# Patient Record
Sex: FEMALE | Race: WHITE | NOT HISPANIC OR LATINO | URBAN - METROPOLITAN AREA
[De-identification: names, ages, dates, MRNs, and addresses within clinical notes are randomized per-mention and may not be internally consistent; named-entity substitution may affect disease eponyms.]

---

## 2017-09-06 ENCOUNTER — INPATIENT (INPATIENT)
Facility: HOSPITAL | Age: 27
LOS: 1 days | Discharge: HOME | End: 2017-09-08
Attending: OBSTETRICS & GYNECOLOGY | Admitting: OBSTETRICS & GYNECOLOGY
Payer: COMMERCIAL

## 2017-09-06 VITALS — HEIGHT: 63 IN | WEIGHT: 158.73 LBS

## 2017-09-06 DIAGNOSIS — Z33.1 PREGNANT STATE, INCIDENTAL: ICD-10-CM

## 2017-09-06 LAB
BASOPHILS # BLD AUTO: 0 K/UL — SIGNIFICANT CHANGE UP (ref 0–0.2)
BASOPHILS NFR BLD AUTO: 0.2 % — SIGNIFICANT CHANGE UP (ref 0–2)
BLD GP AB SCN SERPL QL: NEGATIVE — SIGNIFICANT CHANGE UP
EOSINOPHIL # BLD AUTO: 0 K/UL — SIGNIFICANT CHANGE UP (ref 0–0.5)
EOSINOPHIL NFR BLD AUTO: 0.3 % — SIGNIFICANT CHANGE UP (ref 0–6)
HCT VFR BLD CALC: 35.3 % — SIGNIFICANT CHANGE UP (ref 34.5–45)
HGB BLD-MCNC: 12.4 G/DL — SIGNIFICANT CHANGE UP (ref 11.5–15.5)
LYMPHOCYTES # BLD AUTO: 19.1 % — SIGNIFICANT CHANGE UP (ref 13–44)
LYMPHOCYTES # BLD AUTO: 2.4 K/UL — SIGNIFICANT CHANGE UP (ref 1–3.3)
MCHC RBC-ENTMCNC: 30.2 PG — SIGNIFICANT CHANGE UP (ref 27–34)
MCHC RBC-ENTMCNC: 35.3 GM/DL — SIGNIFICANT CHANGE UP (ref 32–36)
MCV RBC AUTO: 85.5 FL — SIGNIFICANT CHANGE UP (ref 80–100)
MONOCYTES # BLD AUTO: 0.9 K/UL — SIGNIFICANT CHANGE UP (ref 0–0.9)
MONOCYTES NFR BLD AUTO: 6.9 % — SIGNIFICANT CHANGE UP (ref 2–14)
NEUTROPHILS # BLD AUTO: 9.2 K/UL — HIGH (ref 1.8–7.4)
NEUTROPHILS NFR BLD AUTO: 73.5 % — SIGNIFICANT CHANGE UP (ref 43–77)
PLATELET # BLD AUTO: 163 K/UL — SIGNIFICANT CHANGE UP (ref 150–400)
RBC # BLD: 4.12 M/UL — SIGNIFICANT CHANGE UP (ref 3.8–5.2)
RBC # FLD: 12.7 % — SIGNIFICANT CHANGE UP (ref 10.3–14.5)
RH IG SCN BLD-IMP: POSITIVE — SIGNIFICANT CHANGE UP
T PALLIDUM AB TITR SER: NEGATIVE — SIGNIFICANT CHANGE UP
WBC # BLD: 12.5 K/UL — HIGH (ref 3.8–10.5)
WBC # FLD AUTO: 12.5 K/UL — HIGH (ref 3.8–10.5)

## 2017-09-06 RX ORDER — SODIUM CHLORIDE 9 MG/ML
1000 INJECTION, SOLUTION INTRAVENOUS
Qty: 0 | Refills: 0 | Status: DISCONTINUED | OUTPATIENT
Start: 2017-09-06 | End: 2017-09-06

## 2017-09-06 RX ORDER — SODIUM CHLORIDE 9 MG/ML
3 INJECTION INTRAMUSCULAR; INTRAVENOUS; SUBCUTANEOUS EVERY 8 HOURS
Qty: 0 | Refills: 0 | Status: DISCONTINUED | OUTPATIENT
Start: 2017-09-06 | End: 2017-09-08

## 2017-09-06 RX ORDER — DIPHENHYDRAMINE HCL 50 MG
25 CAPSULE ORAL EVERY 6 HOURS
Qty: 0 | Refills: 0 | Status: DISCONTINUED | OUTPATIENT
Start: 2017-09-06 | End: 2017-09-08

## 2017-09-06 RX ORDER — MAGNESIUM HYDROXIDE 400 MG/1
30 TABLET, CHEWABLE ORAL
Qty: 0 | Refills: 0 | Status: DISCONTINUED | OUTPATIENT
Start: 2017-09-06 | End: 2017-09-08

## 2017-09-06 RX ORDER — HYDROCORTISONE 1 %
1 OINTMENT (GRAM) TOPICAL EVERY 4 HOURS
Qty: 0 | Refills: 0 | Status: DISCONTINUED | OUTPATIENT
Start: 2017-09-06 | End: 2017-09-06

## 2017-09-06 RX ORDER — CITRIC ACID/SODIUM CITRATE 300-500 MG
15 SOLUTION, ORAL ORAL EVERY 4 HOURS
Qty: 0 | Refills: 0 | Status: DISCONTINUED | OUTPATIENT
Start: 2017-09-06 | End: 2017-09-06

## 2017-09-06 RX ORDER — KETOROLAC TROMETHAMINE 30 MG/ML
30 SYRINGE (ML) INJECTION ONCE
Qty: 0 | Refills: 0 | Status: DISCONTINUED | OUTPATIENT
Start: 2017-09-06 | End: 2017-09-06

## 2017-09-06 RX ORDER — OXYTOCIN 10 UNIT/ML
41.67 VIAL (ML) INJECTION
Qty: 20 | Refills: 0 | Status: DISCONTINUED | OUTPATIENT
Start: 2017-09-06 | End: 2017-09-08

## 2017-09-06 RX ORDER — ACETAMINOPHEN 500 MG
975 TABLET ORAL EVERY 6 HOURS
Qty: 0 | Refills: 0 | Status: DISCONTINUED | OUTPATIENT
Start: 2017-09-06 | End: 2017-09-08

## 2017-09-06 RX ORDER — HYDROCORTISONE 1 %
1 OINTMENT (GRAM) TOPICAL EVERY 4 HOURS
Qty: 0 | Refills: 0 | Status: DISCONTINUED | OUTPATIENT
Start: 2017-09-06 | End: 2017-09-08

## 2017-09-06 RX ORDER — SODIUM CHLORIDE 9 MG/ML
500 INJECTION, SOLUTION INTRAVENOUS ONCE
Qty: 0 | Refills: 0 | Status: DISCONTINUED | OUTPATIENT
Start: 2017-09-06 | End: 2017-09-06

## 2017-09-06 RX ORDER — LANOLIN
1 OINTMENT (GRAM) TOPICAL EVERY 6 HOURS
Qty: 0 | Refills: 0 | Status: DISCONTINUED | OUTPATIENT
Start: 2017-09-06 | End: 2017-09-08

## 2017-09-06 RX ORDER — ACETAMINOPHEN 500 MG
975 TABLET ORAL EVERY 6 HOURS
Qty: 0 | Refills: 0 | Status: COMPLETED | OUTPATIENT
Start: 2017-09-06 | End: 2018-08-05

## 2017-09-06 RX ORDER — SODIUM CHLORIDE 9 MG/ML
3 INJECTION INTRAMUSCULAR; INTRAVENOUS; SUBCUTANEOUS EVERY 8 HOURS
Qty: 0 | Refills: 0 | Status: DISCONTINUED | OUTPATIENT
Start: 2017-09-06 | End: 2017-09-06

## 2017-09-06 RX ORDER — AER TRAVELER 0.5 G/1
1 SOLUTION RECTAL; TOPICAL EVERY 4 HOURS
Qty: 0 | Refills: 0 | Status: DISCONTINUED | OUTPATIENT
Start: 2017-09-06 | End: 2017-09-06

## 2017-09-06 RX ORDER — OXYTOCIN 10 UNIT/ML
4 VIAL (ML) INJECTION
Qty: 30 | Refills: 0 | Status: DISCONTINUED | OUTPATIENT
Start: 2017-09-06 | End: 2017-09-06

## 2017-09-06 RX ORDER — DIBUCAINE 1 %
1 OINTMENT (GRAM) RECTAL EVERY 4 HOURS
Qty: 0 | Refills: 0 | Status: DISCONTINUED | OUTPATIENT
Start: 2017-09-06 | End: 2017-09-06

## 2017-09-06 RX ORDER — DIBUCAINE 1 %
1 OINTMENT (GRAM) RECTAL EVERY 4 HOURS
Qty: 0 | Refills: 0 | Status: DISCONTINUED | OUTPATIENT
Start: 2017-09-06 | End: 2017-09-08

## 2017-09-06 RX ORDER — SIMETHICONE 80 MG/1
80 TABLET, CHEWABLE ORAL EVERY 6 HOURS
Qty: 0 | Refills: 0 | Status: DISCONTINUED | OUTPATIENT
Start: 2017-09-06 | End: 2017-09-08

## 2017-09-06 RX ORDER — AER TRAVELER 0.5 G/1
1 SOLUTION RECTAL; TOPICAL EVERY 4 HOURS
Qty: 0 | Refills: 0 | Status: DISCONTINUED | OUTPATIENT
Start: 2017-09-06 | End: 2017-09-08

## 2017-09-06 RX ORDER — TETANUS TOXOID, REDUCED DIPHTHERIA TOXOID AND ACELLULAR PERTUSSIS VACCINE, ADSORBED 5; 2.5; 8; 8; 2.5 [IU]/.5ML; [IU]/.5ML; UG/.5ML; UG/.5ML; UG/.5ML
0.5 SUSPENSION INTRAMUSCULAR ONCE
Qty: 0 | Refills: 0 | Status: DISCONTINUED | OUTPATIENT
Start: 2017-09-06 | End: 2017-09-08

## 2017-09-06 RX ORDER — OXYTOCIN 10 UNIT/ML
333.33 VIAL (ML) INJECTION
Qty: 20 | Refills: 0 | Status: DISCONTINUED | OUTPATIENT
Start: 2017-09-06 | End: 2017-09-06

## 2017-09-06 RX ORDER — DOCUSATE SODIUM 100 MG
100 CAPSULE ORAL
Qty: 0 | Refills: 0 | Status: DISCONTINUED | OUTPATIENT
Start: 2017-09-06 | End: 2017-09-08

## 2017-09-06 RX ORDER — INFLUENZA VIRUS VACCINE 15; 15; 15; 15 UG/.5ML; UG/.5ML; UG/.5ML; UG/.5ML
0.5 SUSPENSION INTRAMUSCULAR ONCE
Qty: 0 | Refills: 0 | Status: COMPLETED | OUTPATIENT
Start: 2017-09-06 | End: 2017-09-08

## 2017-09-06 RX ORDER — IBUPROFEN 200 MG
600 TABLET ORAL EVERY 6 HOURS
Qty: 0 | Refills: 0 | Status: COMPLETED | OUTPATIENT
Start: 2017-09-06 | End: 2018-08-05

## 2017-09-06 RX ORDER — GLYCERIN ADULT
1 SUPPOSITORY, RECTAL RECTAL AT BEDTIME
Qty: 0 | Refills: 0 | Status: DISCONTINUED | OUTPATIENT
Start: 2017-09-06 | End: 2017-09-08

## 2017-09-06 RX ORDER — OXYCODONE HYDROCHLORIDE 5 MG/1
5 TABLET ORAL
Qty: 0 | Refills: 0 | Status: DISCONTINUED | OUTPATIENT
Start: 2017-09-06 | End: 2017-09-08

## 2017-09-06 RX ORDER — PRAMOXINE HYDROCHLORIDE 150 MG/15G
1 AEROSOL, FOAM RECTAL EVERY 4 HOURS
Qty: 0 | Refills: 0 | Status: DISCONTINUED | OUTPATIENT
Start: 2017-09-06 | End: 2017-09-06

## 2017-09-06 RX ORDER — PRAMOXINE HYDROCHLORIDE 150 MG/15G
1 AEROSOL, FOAM RECTAL EVERY 4 HOURS
Qty: 0 | Refills: 0 | Status: DISCONTINUED | OUTPATIENT
Start: 2017-09-06 | End: 2017-09-08

## 2017-09-06 RX ORDER — OXYTOCIN 10 UNIT/ML
41.67 VIAL (ML) INJECTION
Qty: 20 | Refills: 0 | Status: DISCONTINUED | OUTPATIENT
Start: 2017-09-06 | End: 2017-09-06

## 2017-09-06 RX ORDER — OXYCODONE HYDROCHLORIDE 5 MG/1
5 TABLET ORAL EVERY 4 HOURS
Qty: 0 | Refills: 0 | Status: DISCONTINUED | OUTPATIENT
Start: 2017-09-06 | End: 2017-09-08

## 2017-09-06 RX ORDER — IBUPROFEN 200 MG
600 TABLET ORAL EVERY 6 HOURS
Qty: 0 | Refills: 0 | Status: DISCONTINUED | OUTPATIENT
Start: 2017-09-06 | End: 2017-09-08

## 2017-09-06 RX ADMIN — Medication 975 MILLIGRAM(S): at 17:25

## 2017-09-06 RX ADMIN — Medication 30 MILLIGRAM(S): at 13:18

## 2017-09-06 RX ADMIN — OXYCODONE HYDROCHLORIDE 5 MILLIGRAM(S): 5 TABLET ORAL at 22:43

## 2017-09-06 RX ADMIN — SODIUM CHLORIDE 125 MILLILITER(S): 9 INJECTION, SOLUTION INTRAVENOUS at 05:46

## 2017-09-06 RX ADMIN — Medication 30 MILLIGRAM(S): at 15:00

## 2017-09-06 RX ADMIN — Medication 4 MILLIUNIT(S)/MIN: at 05:46

## 2017-09-06 RX ADMIN — Medication 600 MILLIGRAM(S): at 19:04

## 2017-09-06 RX ADMIN — Medication 600 MILLIGRAM(S): at 19:40

## 2017-09-06 RX ADMIN — Medication 975 MILLIGRAM(S): at 18:01

## 2017-09-06 RX ADMIN — Medication 975 MILLIGRAM(S): at 23:17

## 2017-09-06 RX ADMIN — OXYCODONE HYDROCHLORIDE 5 MILLIGRAM(S): 5 TABLET ORAL at 22:00

## 2017-09-07 LAB
HCT VFR BLD CALC: 31.2 % — LOW (ref 34.5–45)
HGB BLD-MCNC: 10.1 G/DL — LOW (ref 11.5–15.5)

## 2017-09-07 RX ADMIN — Medication 975 MILLIGRAM(S): at 13:10

## 2017-09-07 RX ADMIN — Medication 1 TABLET(S): at 12:39

## 2017-09-07 RX ADMIN — Medication 600 MILLIGRAM(S): at 09:20

## 2017-09-07 RX ADMIN — Medication 975 MILLIGRAM(S): at 19:50

## 2017-09-07 RX ADMIN — OXYCODONE HYDROCHLORIDE 5 MILLIGRAM(S): 5 TABLET ORAL at 04:45

## 2017-09-07 RX ADMIN — Medication 600 MILLIGRAM(S): at 23:05

## 2017-09-07 RX ADMIN — Medication 600 MILLIGRAM(S): at 08:47

## 2017-09-07 RX ADMIN — Medication 975 MILLIGRAM(S): at 06:20

## 2017-09-07 RX ADMIN — OXYCODONE HYDROCHLORIDE 5 MILLIGRAM(S): 5 TABLET ORAL at 01:00

## 2017-09-07 RX ADMIN — OXYCODONE HYDROCHLORIDE 5 MILLIGRAM(S): 5 TABLET ORAL at 09:20

## 2017-09-07 RX ADMIN — Medication 600 MILLIGRAM(S): at 17:00

## 2017-09-07 RX ADMIN — Medication 975 MILLIGRAM(S): at 12:40

## 2017-09-07 RX ADMIN — OXYCODONE HYDROCHLORIDE 5 MILLIGRAM(S): 5 TABLET ORAL at 03:57

## 2017-09-07 RX ADMIN — OXYCODONE HYDROCHLORIDE 5 MILLIGRAM(S): 5 TABLET ORAL at 08:47

## 2017-09-07 RX ADMIN — Medication 600 MILLIGRAM(S): at 01:45

## 2017-09-07 RX ADMIN — Medication 600 MILLIGRAM(S): at 22:34

## 2017-09-07 RX ADMIN — Medication 975 MILLIGRAM(S): at 20:20

## 2017-09-07 RX ADMIN — Medication 600 MILLIGRAM(S): at 01:00

## 2017-09-07 RX ADMIN — Medication 100 MILLIGRAM(S): at 12:39

## 2017-09-07 RX ADMIN — Medication 600 MILLIGRAM(S): at 16:31

## 2017-09-07 RX ADMIN — OXYCODONE HYDROCHLORIDE 5 MILLIGRAM(S): 5 TABLET ORAL at 01:45

## 2017-09-07 RX ADMIN — Medication 975 MILLIGRAM(S): at 00:00

## 2017-09-07 NOTE — PROGRESS NOTE ADULT - PROBLEM SELECTOR PLAN 1
- Pain well controlled, continue current pain regimen  - Increase ambulation, SCDs when not ambulating  - Continue regular diet    Alcira Celaya D.O. PGY1

## 2017-09-07 NOTE — PROGRESS NOTE ADULT - SUBJECTIVE AND OBJECTIVE BOX
OB Progress Note:  PPD#1    S: 28yo PPD#1 s/p . Patient feels well. Pain is well controlled. She is tolerating a regular diet and passing flatus. She is voiding spontaneously, and ambulating without difficulty. Denies CP/SOB. Denies lightheadedness/dizziness. Denies N/V.    O:  Vitals:  Vital Signs Last 24 Hrs  T(C): 36.7 (07 Sep 2017 05:26), Max: 36.8 (06 Sep 2017 17:00)  T(F): 98.1 (07 Sep 2017 05:26), Max: 98.2 (06 Sep 2017 17:00)  HR: 70 (07 Sep 2017 05:) (64 - 83)  BP: 97/61 (07 Sep 2017 05:26) (93/61 - 127/65)  BP(mean): --  RR: 18 (07 Sep 2017 05:26) (18 - 20)  SpO2: 97% (07 Sep 2017 05:) (97% - 99%)    MEDICATIONS  (STANDING):  oxyCODONE    IR 5 milliGRAM(s) Oral every 3 hours  sodium chloride 0.9% lock flush 3 milliLiter(s) IV Push every 8 hours  diphtheria/tetanus/pertussis (acellular) Vaccine (ADAcel) 0.5 milliLiter(s) IntraMuscular once  oxytocin Infusion 41.667 milliUNIT(s)/Min (125 mL/Hr) IV Continuous <Continuous>  prenatal multivitamin 1 Tablet(s) Oral daily  influenza   Vaccine 0.5 milliLiter(s) IntraMuscular once  acetaminophen   Tablet. 975 milliGRAM(s) Oral every 6 hours  ibuprofen  Tablet 600 milliGRAM(s) Oral every 6 hours      Labs:  Blood type: A Positive  Rubella IgG: RPR: Negative                          12.4   12.5<H> >-----------< 163    (  @ 05:41 )             35.3      Physical Exam:  General: NAD  Abdomen: soft, non-tender, non-distended, fundus firm  Vaginal: Lochia wnl  Extremities: NTBL

## 2017-09-08 ENCOUNTER — TRANSCRIPTION ENCOUNTER (OUTPATIENT)
Age: 27
End: 2017-09-08

## 2017-09-08 VITALS
HEART RATE: 74 BPM | RESPIRATION RATE: 18 BRPM | TEMPERATURE: 98 F | DIASTOLIC BLOOD PRESSURE: 66 MMHG | SYSTOLIC BLOOD PRESSURE: 103 MMHG

## 2017-09-08 PROCEDURE — 86901 BLOOD TYPING SEROLOGIC RH(D): CPT

## 2017-09-08 PROCEDURE — 86850 RBC ANTIBODY SCREEN: CPT

## 2017-09-08 PROCEDURE — 59025 FETAL NON-STRESS TEST: CPT

## 2017-09-08 PROCEDURE — 85027 COMPLETE CBC AUTOMATED: CPT

## 2017-09-08 PROCEDURE — 59050 FETAL MONITOR W/REPORT: CPT

## 2017-09-08 PROCEDURE — 86900 BLOOD TYPING SEROLOGIC ABO: CPT

## 2017-09-08 PROCEDURE — 85018 HEMOGLOBIN: CPT

## 2017-09-08 PROCEDURE — 86780 TREPONEMA PALLIDUM: CPT

## 2017-09-08 PROCEDURE — 90686 IIV4 VACC NO PRSV 0.5 ML IM: CPT

## 2017-09-08 RX ORDER — DOCUSATE SODIUM 100 MG
1 CAPSULE ORAL
Qty: 0 | Refills: 0 | DISCHARGE
Start: 2017-09-08

## 2017-09-08 RX ORDER — ACETAMINOPHEN 500 MG
3 TABLET ORAL
Qty: 0 | Refills: 0 | DISCHARGE
Start: 2017-09-08

## 2017-09-08 RX ADMIN — Medication 600 MILLIGRAM(S): at 05:52

## 2017-09-08 RX ADMIN — Medication 1 TABLET(S): at 11:57

## 2017-09-08 RX ADMIN — Medication 600 MILLIGRAM(S): at 11:57

## 2017-09-08 RX ADMIN — Medication 975 MILLIGRAM(S): at 09:58

## 2017-09-08 RX ADMIN — Medication 975 MILLIGRAM(S): at 01:54

## 2017-09-08 RX ADMIN — Medication 600 MILLIGRAM(S): at 06:25

## 2017-09-08 RX ADMIN — INFLUENZA VIRUS VACCINE 0.5 MILLILITER(S): 15; 15; 15; 15 SUSPENSION INTRAMUSCULAR at 05:53

## 2017-09-08 RX ADMIN — Medication 975 MILLIGRAM(S): at 02:30

## 2017-09-08 NOTE — DISCHARGE NOTE OB - CARE PLAN
Principal Discharge DX:	Spontaneous vaginal delivery  Goal:	recovery  Instructions for follow-up, activity and diet:	follow up in office in 6 weeks for postpartum visit

## 2017-09-08 NOTE — DISCHARGE NOTE OB - CARE PROVIDER_API CALL
Sanya Ching), Obstetrics and Gynecology  [address] [place] [id number]  Phone: [phone]  Fax: [phone]

## 2017-09-08 NOTE — PROGRESS NOTE ADULT - SUBJECTIVE AND OBJECTIVE BOX
PPD#2- ATTENDING NOTE    S: Patient doing well. Minimal lochia. Pain controlled.    O: Vital Signs Last 24 Hrs  T(C): 36.8 (08 Sep 2017 03:25), Max: 36.8 (08 Sep 2017 03:25)  T(F): 98.2 (08 Sep 2017 03:25), Max: 98.2 (08 Sep 2017 03:25)  HR: 74 (08 Sep 2017 03:25) (74 - 80)  BP: 103/66 (08 Sep 2017 03:25) (99/69 - 103/66)  BP(mean): --  RR: 18 (08 Sep 2017 03:25) (18 - 18)  SpO2: --    Gen: NAD  Abd: soft, NT, ND, fundus firm below umbilicus  Lochia: moderate  Ext: no tenderness, no hyper reflexia    Labs:                        10.1   x     )-----------( x        ( 07 Sep 2017 09:03 )             31.2

## 2017-09-08 NOTE — DISCHARGE NOTE OB - MATERIALS PROVIDED
Bottle Feeding Log/Shaken Baby Prevention Handout/Breastfeeding Mother’s Support Group Information/Breastfeeding Guide and Packet/Birth Certificate Instructions/Guide to Postpartum Care/Back To Sleep Handout/[facility] Hearing Screen Program/[facility]  Screening Program/Breastfeeding Log

## 2017-09-08 NOTE — DISCHARGE NOTE OB - MEDICATION SUMMARY - MEDICATIONS TO TAKE
I will START or STAY ON the medications listed below when I get home from the hospital:    ibuprofen 600 mg oral tablet  -- 1 tab(s) by mouth every 6 hours  -- Indication: For Pain    acetaminophen 325 mg oral tablet  -- 3 tab(s) by mouth every 6 hours  -- Indication: For Pain    docusate sodium 100 mg oral capsule  -- 1 cap(s) by mouth 2 times a day, As needed, Stool Softening  -- Indication: For constipation

## 2017-09-08 NOTE — DISCHARGE NOTE OB - PATIENT PORTAL LINK FT
“You can access the FollowHealth Patient Portal, offered by [facility], by registering with the following website: [email or web address]”

## 2019-07-22 ENCOUNTER — APPOINTMENT (OUTPATIENT)
Dept: ANTEPARTUM | Facility: CLINIC | Age: 29
End: 2019-07-22
Payer: COMMERCIAL

## 2019-07-22 ENCOUNTER — ASOB RESULT (OUTPATIENT)
Age: 29
End: 2019-07-22

## 2019-07-22 PROCEDURE — 76805 OB US >/= 14 WKS SNGL FETUS: CPT

## 2019-08-13 ENCOUNTER — EMERGENCY (EMERGENCY)
Facility: HOSPITAL | Age: 29
LOS: 1 days | Discharge: HOME | End: 2019-08-13
Attending: EMERGENCY MEDICINE
Payer: COMMERCIAL

## 2019-08-13 VITALS
DIASTOLIC BLOOD PRESSURE: 64 MMHG | OXYGEN SATURATION: 100 % | SYSTOLIC BLOOD PRESSURE: 105 MMHG | HEART RATE: 84 BPM | RESPIRATION RATE: 18 BRPM

## 2019-08-13 VITALS
WEIGHT: 134.92 LBS | DIASTOLIC BLOOD PRESSURE: 75 MMHG | SYSTOLIC BLOOD PRESSURE: 156 MMHG | HEART RATE: 93 BPM | TEMPERATURE: 98 F | RESPIRATION RATE: 22 BRPM | OXYGEN SATURATION: 97 %

## 2019-08-13 PROCEDURE — 76815 OB US LIMITED FETUS(S): CPT | Mod: 26

## 2019-08-13 PROCEDURE — 99284 EMERGENCY DEPT VISIT MOD MDM: CPT

## 2019-08-13 PROCEDURE — 76815 OB US LIMITED FETUS(S): CPT

## 2019-08-13 RX ORDER — AZITHROMYCIN 500 MG/1
500 TABLET, FILM COATED ORAL ONCE
Refills: 0 | Status: COMPLETED | OUTPATIENT
Start: 2019-08-13 | End: 2019-08-13

## 2019-08-13 RX ORDER — ACETAMINOPHEN 500 MG
650 TABLET ORAL ONCE
Refills: 0 | Status: COMPLETED | OUTPATIENT
Start: 2019-08-13 | End: 2019-08-13

## 2019-08-13 RX ORDER — LIDOCAINE 4 G/100G
1 CREAM TOPICAL ONCE
Refills: 0 | Status: DISCONTINUED | OUTPATIENT
Start: 2019-08-13 | End: 2019-08-30

## 2019-08-13 RX ORDER — AZITHROMYCIN 500 MG/1
1 TABLET, FILM COATED ORAL
Qty: 4 | Refills: 0
Start: 2019-08-13 | End: 2019-08-16

## 2019-08-13 RX ADMIN — Medication 650 MILLIGRAM(S): at 07:35

## 2019-08-13 RX ADMIN — AZITHROMYCIN 500 MILLIGRAM(S): 500 TABLET, FILM COATED ORAL at 07:41

## 2019-08-13 NOTE — ED PROVIDER NOTE - PROGRESS NOTE DETAILS
Attending note (Jean-Pierre): given allergy profile and pregnancy, will prescribe azithromycin (500mg x1 followed by 250mg qd x 4d); trial topical lidocaine in ED with improvement (and s/p tylenol).  stable for dc.

## 2019-08-13 NOTE — ED PROVIDER NOTE - NS ED ROS FT
General: no fevers or chills  Head: no lightheadedness  Eyes: no red eye or discharge  ENT: +sore throat, +left ear pain, no nasal discharge/congestion  CV: no chest pain  Resp: no SOB, no cough  GI: +N/V, no abdominal pain  : no dysuria, no vaginal discharge  MSK: no joint pain, no muscle aches, no neck pain or back pain  Skin: no new rash  Neuro: no focal weakness, no change in sensation

## 2019-08-13 NOTE — ED PROVIDER NOTE - OBJECTIVE STATEMENT
30yo pregnant woman due date 12/8/19 presents with left ear pain x 1 day with associated sore throat for the past few days with no fever and 1 episode of nonbloody emesis this morning. She denies cough, runny nose, eye redness or discharge. No chest pain or SOB. No abdominal pain. No dysuria. Pt took 1 extra strength tylenol at 3am this morning with no relief. 28yo pregnant woman due date 19 presents with left ear pain x 1 day with associated sore throat for the past few days with no fever and 1 episode of nonbloody emesis this morning. She denies cough, runny nose, eye redness or discharge. No chest pain or SOB. No abdominal pain. No dysuria. Pt took 1 extra strength tylenol at 3am this morning with no relief.    Attending note (Jean-Pierre): 30y/o F with no PMH,  7 mo pregnant, presenting with sore throat for 3 days (improving since last night) and left ear pain since last night, not helped by 500mg tylenol.  No ear drainage, no redness, no fever, no vomiting.  Occasional cough (when I asked) but nonproductive.  + sick contacts (2 small children at home with cough, runny nose and congestion/cold symptoms recently).  No rash.  No recent travel.

## 2019-08-13 NOTE — ED ADULT NURSE NOTE - OBJECTIVE STATEMENT
Patient is a 29 year old female complaining of L. ear pain and sore throat since last night. Arrived by walk-in. Patient has no pmhx, currently about 7 mo pregnant. Patient is A&O x 4 and appears well. Pt reports she took 1000mg Tylenol at 0300 with minimal relief, called OB who told her it is a viral illness, patient states "how am I supposed to live with this?". Endorsing complaints of L. ear pain and nausea in ED. Reports she vomit 1 time this morning. Has had little po intake. Denies complaints of chest pain, sob, fevers, chills, diarrhea, headache, syncope, burning urination, blood in urine, blood in stool. No redness or exudates noted in throat. Abd is soft, non tender, non distended. Skin is warm and dry. Color is consistent with ethnicity. VSS/ NAD. Safety and comfort maintained. Mother at the bedside. Will continue to monitor.

## 2019-08-13 NOTE — ED ADULT NURSE NOTE - NSIMPLEMENTINTERV_GEN_ALL_ED
Implemented All Universal Safety Interventions:  [employer] to call system. Call bell, personal items and telephone within reach. Instruct patient to call for assistance. Room bathroom lighting operational. Non-slip footwear when patient is off stretcher. Physically safe environment: no spills, clutter or unnecessary equipment. Stretcher in lowest position, wheels locked, appropriate side rails in place.

## 2019-08-13 NOTE — ED PROVIDER NOTE - ATTENDING CONTRIBUTION TO CARE
30y/o F with no PMH,  7 mo pregnant, presenting with sore throat for 3 days (improving since last night) and left ear pain since last night, not helped by 500mg tylenol.  No ear drainage, no redness, no fever, no vomiting.      ROS: Occasional cough (when I asked) but nonproductive.  + sick contacts (2 small children at home with cough, runny nose and congestion/cold symptoms recently).  No rash.  No recent travel.  No fever/chills.  No abd pain.  No pelvic pain, no vaginal bleeding/discharge. No urinary symptoms.    On Physical Exam:  General: well appearing, in NAD, speaking clearly in full sentences and without difficulty; cooperative with exam  HEENT: PERRL, MMM; L TM bulging, erythematous, pus behind TM  Neck: no neck tenderness, no nuchal rigidity  Cardiac: normal s1, s2; RRR; no MGR  Lungs: CTABL  Abdomen: soft nontender/nondistended  : no bladder tenderness or distension  Skin: intact, no rash  Extremities: no peripheral edema, no gross deformities  Neuro: no gross neurologic deficits     AP: L otitis media, intact TM; given allergy profile and pregnancy, will prescribe azithromycin (500mg x1 followed by 250mg qd x 4d); trial topical lidocaine in ED with improvement (and s/p tylenol).  stable for dc.

## 2019-08-13 NOTE — ED PROVIDER NOTE - PHYSICAL EXAMINATION
General: well-appearing young woman in mild distress due to pain  Head: normocephalic, atraumatic  Eyes: PERRL, EOMI, no conjunctival injection  Ears: left ear bulging but intact TM with surround erythema, left TM intact with no erythema  Mouth: moist mucous membranes, no oropharyngeal erythema or exudates  Neck: supple neck, no lymphadenopathy  CV: normal rate and rhythm, no LE edema, peripheral pulses 2+ bilaterally  Respiratory: clear to auscultation bilaterally  Abdomen: soft, nontender, nondistended  : no suprapubic tenderness, no CVAT  MSK: no Cspine tenderness to palpation, no midline tenderness to palpation  Neuro: alert and oriented x3, speech clear, moving all extremities spontaneously  Extremities: full ROM, no tenderness to palpation of joints General: well-appearing young woman in mild distress due to pain  Head: normocephalic, atraumatic  Eyes: PERRL, EOMI, no conjunctival injection  Ears: left ear bulging but intact TM with surround erythema, left TM intact with no erythema  Mouth: moist mucous membranes, no oropharyngeal erythema or exudates  Neck: supple neck, no lymphadenopathy  CV: normal rate and rhythm, no LE edema, peripheral pulses 2+ bilaterally  Respiratory: clear to auscultation bilaterally  Abdomen: soft, nontender, nondistended  : no suprapubic tenderness, no CVAT  MSK: no Cspine tenderness to palpation, no midline tenderness to palpation  Neuro: alert and oriented x3, speech clear, moving all extremities spontaneously  Extremities: full ROM, no tenderness to palpation of joints    Attending note (Jean-Pierre): I agree with above with my physical exam findings are documented below:   ENT: L TM bulging, erythematous, pus behind TM

## 2019-08-13 NOTE — ED PROVIDER NOTE - NSFOLLOWUPINSTRUCTIONS_ED_ALL_ED_FT
Please follow up with your primary care provider in the next few days.    Continue the antibiotic (azithromycin) 250mg once a day starting tomorrow for the next 4 days.    Take 975mg tylenol every 6-8 hours as needed for pain.    Return to the ED for any worsening symptoms or any new or concerning symptoms.    Please read all attached. Please follow up with an ENT in the next few days.    Continue the antibiotic (azithromycin) 250mg once a day starting tomorrow for the next 4 days.    Take 975mg tylenol every 6-8 hours as needed for pain.    Return to the ED for any worsening symptoms or any new or concerning symptoms.    Please read all attached.

## 2019-08-13 NOTE — ED PROVIDER NOTE - NSFOLLOWUPCLINICS_GEN_ALL_ED_FT
[facility] - ENT  Otolaryngology (ENT)  [address] [place] [address]  Phone: [phone]  Fax:   Follow Up Time:

## 2019-11-04 PROBLEM — Z78.9 OTHER SPECIFIED HEALTH STATUS: Chronic | Status: ACTIVE | Noted: 2019-08-13

## 2019-12-02 ENCOUNTER — INPATIENT (INPATIENT)
Facility: HOSPITAL | Age: 29
LOS: 1 days | Discharge: HOME | End: 2019-12-04
Attending: OBSTETRICS & GYNECOLOGY | Admitting: OBSTETRICS & GYNECOLOGY
Payer: COMMERCIAL

## 2019-12-02 VITALS — HEIGHT: 64 IN | WEIGHT: 156.53 LBS

## 2019-12-02 DIAGNOSIS — O26.899 OTHER SPECIFIED PREGNANCY RELATED CONDITIONS, UNSPECIFIED TRIMESTER: ICD-10-CM

## 2019-12-02 DIAGNOSIS — Z34.80 ENCOUNTER FOR SUPERVISION OF OTHER NORMAL PREGNANCY, UNSPECIFIED TRIMESTER: ICD-10-CM

## 2019-12-02 DIAGNOSIS — Z3A.00 WEEKS OF GESTATION OF PREGNANCY NOT SPECIFIED: ICD-10-CM

## 2019-12-02 LAB
BASOPHILS # BLD AUTO: 0.02 K/UL — SIGNIFICANT CHANGE UP (ref 0–0.2)
BASOPHILS NFR BLD AUTO: 0.2 % — SIGNIFICANT CHANGE UP (ref 0–2)
BLD GP AB SCN SERPL QL: NEGATIVE — SIGNIFICANT CHANGE UP
EOSINOPHIL # BLD AUTO: 0.11 K/UL — SIGNIFICANT CHANGE UP (ref 0–0.5)
EOSINOPHIL NFR BLD AUTO: 1.2 % — SIGNIFICANT CHANGE UP (ref 0–6)
HCT VFR BLD CALC: 32.7 % — LOW (ref 34.5–45)
HGB BLD-MCNC: 10.7 G/DL — LOW (ref 11.5–15.5)
IMM GRANULOCYTES NFR BLD AUTO: 0.7 % — SIGNIFICANT CHANGE UP (ref 0–1.5)
LYMPHOCYTES # BLD AUTO: 1.7 K/UL — SIGNIFICANT CHANGE UP (ref 1–3.3)
LYMPHOCYTES # BLD AUTO: 18.1 % — SIGNIFICANT CHANGE UP (ref 13–44)
MCHC RBC-ENTMCNC: 26.8 PG — LOW (ref 27–34)
MCHC RBC-ENTMCNC: 32.7 GM/DL — SIGNIFICANT CHANGE UP (ref 32–36)
MCV RBC AUTO: 81.8 FL — SIGNIFICANT CHANGE UP (ref 80–100)
MONOCYTES # BLD AUTO: 0.79 K/UL — SIGNIFICANT CHANGE UP (ref 0–0.9)
MONOCYTES NFR BLD AUTO: 8.4 % — SIGNIFICANT CHANGE UP (ref 2–14)
NEUTROPHILS # BLD AUTO: 6.72 K/UL — SIGNIFICANT CHANGE UP (ref 1.8–7.4)
NEUTROPHILS NFR BLD AUTO: 71.4 % — SIGNIFICANT CHANGE UP (ref 43–77)
NRBC # BLD: 0 /100 WBCS — SIGNIFICANT CHANGE UP (ref 0–0)
PLATELET # BLD AUTO: 142 K/UL — LOW (ref 150–400)
RBC # BLD: 4 M/UL — SIGNIFICANT CHANGE UP (ref 3.8–5.2)
RBC # FLD: 13.9 % — SIGNIFICANT CHANGE UP (ref 10.3–14.5)
RH IG SCN BLD-IMP: POSITIVE — SIGNIFICANT CHANGE UP
T PALLIDUM AB TITR SER: NEGATIVE — SIGNIFICANT CHANGE UP
WBC # BLD: 9.41 K/UL — SIGNIFICANT CHANGE UP (ref 3.8–10.5)
WBC # FLD AUTO: 9.41 K/UL — SIGNIFICANT CHANGE UP (ref 3.8–10.5)

## 2019-12-02 RX ORDER — MAGNESIUM HYDROXIDE 400 MG/1
30 TABLET, CHEWABLE ORAL
Refills: 0 | Status: DISCONTINUED | OUTPATIENT
Start: 2019-12-02 | End: 2019-12-04

## 2019-12-02 RX ORDER — GLYCERIN ADULT
1 SUPPOSITORY, RECTAL RECTAL AT BEDTIME
Refills: 0 | Status: DISCONTINUED | OUTPATIENT
Start: 2019-12-02 | End: 2019-12-04

## 2019-12-02 RX ORDER — OXYTOCIN 10 UNIT/ML
4 VIAL (ML) INJECTION
Qty: 30 | Refills: 0 | Status: DISCONTINUED | OUTPATIENT
Start: 2019-12-02 | End: 2019-12-02

## 2019-12-02 RX ORDER — TETANUS TOXOID, REDUCED DIPHTHERIA TOXOID AND ACELLULAR PERTUSSIS VACCINE, ADSORBED 5; 2.5; 8; 8; 2.5 [IU]/.5ML; [IU]/.5ML; UG/.5ML; UG/.5ML; UG/.5ML
0.5 SUSPENSION INTRAMUSCULAR ONCE
Refills: 0 | Status: DISCONTINUED | OUTPATIENT
Start: 2019-12-02 | End: 2019-12-04

## 2019-12-02 RX ORDER — DIPHENHYDRAMINE HCL 50 MG
25 CAPSULE ORAL EVERY 6 HOURS
Refills: 0 | Status: DISCONTINUED | OUTPATIENT
Start: 2019-12-02 | End: 2019-12-04

## 2019-12-02 RX ORDER — HYDROCORTISONE 1 %
1 OINTMENT (GRAM) TOPICAL EVERY 6 HOURS
Refills: 0 | Status: DISCONTINUED | OUTPATIENT
Start: 2019-12-02 | End: 2019-12-04

## 2019-12-02 RX ORDER — LANOLIN
1 OINTMENT (GRAM) TOPICAL EVERY 6 HOURS
Refills: 0 | Status: DISCONTINUED | OUTPATIENT
Start: 2019-12-02 | End: 2019-12-04

## 2019-12-02 RX ORDER — ACETAMINOPHEN 500 MG
975 TABLET ORAL
Refills: 0 | Status: DISCONTINUED | OUTPATIENT
Start: 2019-12-02 | End: 2019-12-04

## 2019-12-02 RX ORDER — SIMETHICONE 80 MG/1
80 TABLET, CHEWABLE ORAL EVERY 4 HOURS
Refills: 0 | Status: DISCONTINUED | OUTPATIENT
Start: 2019-12-02 | End: 2019-12-04

## 2019-12-02 RX ORDER — OXYCODONE HYDROCHLORIDE 5 MG/1
5 TABLET ORAL ONCE
Refills: 0 | Status: DISCONTINUED | OUTPATIENT
Start: 2019-12-02 | End: 2019-12-04

## 2019-12-02 RX ORDER — IBUPROFEN 200 MG
600 TABLET ORAL EVERY 6 HOURS
Refills: 0 | Status: DISCONTINUED | OUTPATIENT
Start: 2019-12-02 | End: 2019-12-04

## 2019-12-02 RX ORDER — IBUPROFEN 200 MG
600 TABLET ORAL EVERY 6 HOURS
Refills: 0 | Status: COMPLETED | OUTPATIENT
Start: 2019-12-02 | End: 2020-10-30

## 2019-12-02 RX ORDER — SODIUM CHLORIDE 9 MG/ML
3 INJECTION INTRAMUSCULAR; INTRAVENOUS; SUBCUTANEOUS EVERY 8 HOURS
Refills: 0 | Status: DISCONTINUED | OUTPATIENT
Start: 2019-12-02 | End: 2019-12-04

## 2019-12-02 RX ORDER — CITRIC ACID/SODIUM CITRATE 300-500 MG
15 SOLUTION, ORAL ORAL EVERY 6 HOURS
Refills: 0 | Status: DISCONTINUED | OUTPATIENT
Start: 2019-12-02 | End: 2019-12-02

## 2019-12-02 RX ORDER — AER TRAVELER 0.5 G/1
1 SOLUTION RECTAL; TOPICAL EVERY 4 HOURS
Refills: 0 | Status: DISCONTINUED | OUTPATIENT
Start: 2019-12-02 | End: 2019-12-04

## 2019-12-02 RX ORDER — BENZOCAINE 10 %
1 GEL (GRAM) MUCOUS MEMBRANE EVERY 6 HOURS
Refills: 0 | Status: DISCONTINUED | OUTPATIENT
Start: 2019-12-02 | End: 2019-12-04

## 2019-12-02 RX ORDER — SODIUM CHLORIDE 9 MG/ML
1000 INJECTION, SOLUTION INTRAVENOUS
Refills: 0 | Status: DISCONTINUED | OUTPATIENT
Start: 2019-12-02 | End: 2019-12-02

## 2019-12-02 RX ORDER — DIBUCAINE 1 %
1 OINTMENT (GRAM) RECTAL EVERY 6 HOURS
Refills: 0 | Status: DISCONTINUED | OUTPATIENT
Start: 2019-12-02 | End: 2019-12-04

## 2019-12-02 RX ORDER — KETOROLAC TROMETHAMINE 30 MG/ML
30 SYRINGE (ML) INJECTION ONCE
Refills: 0 | Status: DISCONTINUED | OUTPATIENT
Start: 2019-12-02 | End: 2019-12-02

## 2019-12-02 RX ORDER — OXYCODONE HYDROCHLORIDE 5 MG/1
5 TABLET ORAL
Refills: 0 | Status: DISCONTINUED | OUTPATIENT
Start: 2019-12-02 | End: 2019-12-04

## 2019-12-02 RX ORDER — OXYTOCIN 10 UNIT/ML
333.33 VIAL (ML) INJECTION
Qty: 20 | Refills: 0 | Status: DISCONTINUED | OUTPATIENT
Start: 2019-12-02 | End: 2019-12-04

## 2019-12-02 RX ORDER — PRAMOXINE HYDROCHLORIDE 150 MG/15G
1 AEROSOL, FOAM RECTAL EVERY 4 HOURS
Refills: 0 | Status: DISCONTINUED | OUTPATIENT
Start: 2019-12-02 | End: 2019-12-04

## 2019-12-02 RX ADMIN — Medication 975 MILLIGRAM(S): at 16:00

## 2019-12-02 RX ADMIN — SODIUM CHLORIDE 125 MILLILITER(S): 9 INJECTION, SOLUTION INTRAVENOUS at 05:00

## 2019-12-02 RX ADMIN — Medication 4 MILLIUNIT(S)/MIN: at 05:05

## 2019-12-02 RX ADMIN — Medication 975 MILLIGRAM(S): at 15:05

## 2019-12-02 RX ADMIN — Medication 975 MILLIGRAM(S): at 21:30

## 2019-12-02 RX ADMIN — Medication 30 MILLIGRAM(S): at 11:36

## 2019-12-02 RX ADMIN — Medication 600 MILLIGRAM(S): at 18:04

## 2019-12-02 RX ADMIN — Medication 600 MILLIGRAM(S): at 18:46

## 2019-12-02 RX ADMIN — Medication 975 MILLIGRAM(S): at 20:32

## 2019-12-03 DIAGNOSIS — Z37.9 OUTCOME OF DELIVERY, UNSPECIFIED: ICD-10-CM

## 2019-12-03 LAB
HCT VFR BLD CALC: 31.4 % — LOW (ref 34.5–45)
HGB BLD-MCNC: 10 G/DL — LOW (ref 11.5–15.5)

## 2019-12-03 RX ADMIN — Medication 975 MILLIGRAM(S): at 15:39

## 2019-12-03 RX ADMIN — Medication 600 MILLIGRAM(S): at 05:53

## 2019-12-03 RX ADMIN — Medication 975 MILLIGRAM(S): at 21:03

## 2019-12-03 RX ADMIN — Medication 600 MILLIGRAM(S): at 18:38

## 2019-12-03 RX ADMIN — Medication 1 TABLET(S): at 12:43

## 2019-12-03 RX ADMIN — OXYCODONE HYDROCHLORIDE 5 MILLIGRAM(S): 5 TABLET ORAL at 03:30

## 2019-12-03 RX ADMIN — Medication 600 MILLIGRAM(S): at 00:33

## 2019-12-03 RX ADMIN — Medication 975 MILLIGRAM(S): at 09:20

## 2019-12-03 RX ADMIN — Medication 975 MILLIGRAM(S): at 02:32

## 2019-12-03 RX ADMIN — OXYCODONE HYDROCHLORIDE 5 MILLIGRAM(S): 5 TABLET ORAL at 21:02

## 2019-12-03 RX ADMIN — Medication 600 MILLIGRAM(S): at 13:12

## 2019-12-03 RX ADMIN — Medication 975 MILLIGRAM(S): at 09:50

## 2019-12-03 RX ADMIN — Medication 975 MILLIGRAM(S): at 15:09

## 2019-12-03 RX ADMIN — Medication 600 MILLIGRAM(S): at 18:08

## 2019-12-03 RX ADMIN — Medication 600 MILLIGRAM(S): at 06:40

## 2019-12-03 RX ADMIN — Medication 975 MILLIGRAM(S): at 22:00

## 2019-12-03 RX ADMIN — OXYCODONE HYDROCHLORIDE 5 MILLIGRAM(S): 5 TABLET ORAL at 22:00

## 2019-12-03 RX ADMIN — OXYCODONE HYDROCHLORIDE 5 MILLIGRAM(S): 5 TABLET ORAL at 02:32

## 2019-12-03 RX ADMIN — Medication 600 MILLIGRAM(S): at 01:30

## 2019-12-03 RX ADMIN — Medication 600 MILLIGRAM(S): at 12:42

## 2019-12-03 RX ADMIN — Medication 975 MILLIGRAM(S): at 03:30

## 2019-12-03 NOTE — PROGRESS NOTE ADULT - ATTENDING COMMENTS
I agree with the PA's note above.    Patient is doing well. Pain is well controlled. Tolerating regular diet, ambulating, and voiding.  Vital signs stable    Plan:  Reg diet  Ambulating  Pain control  Routine postpartum care    gchow

## 2019-12-03 NOTE — PROGRESS NOTE ADULT - SUBJECTIVE AND OBJECTIVE BOX
Postpartum Note- PPD#1    Prenatal Labs  Blood type: A Positive  Rubella IgG: Immune  RPR: Negative      Patient is a 29 F   s/p VAVD PPD 1     Patient was seen lying comfortably in bed. No acute events overnight.  Pt admits to mild pain but states it is tolerable with meds  Is tolerating diet well without any nausea or vomiting.  Ambulating well. Voiding without difficulty. Pt has passed flatus, but denies BM  Lochia is WNL.   Pt is denying any chest pain, sob, dizziness, weakness     O:  Vital Signs Last 24 Hrs  T(C): 36.6 (03 Dec 2019 05:00), Max: 37.1 (02 Dec 2019 12:20)  T(F): 97.9 (03 Dec 2019 05:00), Max: 98.8 (02 Dec 2019 12:20)  HR: 83 (03 Dec 2019 05:00) (67 - 86)  BP: 93/58 (03 Dec 2019 05:00) (93/58 - 124/61)  BP(mean): --  RR: 18 (03 Dec 2019 05:00) (17 - 20)  SpO2: 98% (03 Dec 2019 05:00) (98% - 99%)       PE  General: Pt is lying in bed, NAD, A+O x 3  Cardio: RRR  Lungs: NLB  Abdomen:  Belly is soft and mildly tender to palpation, fundus is firm.   Ext: Calves are soft and non tender to palpation b/l. (-) edema      LABS:    Hemoglobin: 10.7 g/dL ( @ 05:22)      Hematocrit: 32.7 % ( @ 05:22)

## 2019-12-04 ENCOUNTER — TRANSCRIPTION ENCOUNTER (OUTPATIENT)
Age: 29
End: 2019-12-04

## 2019-12-04 VITALS
OXYGEN SATURATION: 97 % | SYSTOLIC BLOOD PRESSURE: 110 MMHG | RESPIRATION RATE: 18 BRPM | TEMPERATURE: 97 F | DIASTOLIC BLOOD PRESSURE: 66 MMHG | HEART RATE: 67 BPM

## 2019-12-04 PROCEDURE — G0463: CPT

## 2019-12-04 PROCEDURE — 86850 RBC ANTIBODY SCREEN: CPT

## 2019-12-04 PROCEDURE — 85018 HEMOGLOBIN: CPT

## 2019-12-04 PROCEDURE — 86900 BLOOD TYPING SEROLOGIC ABO: CPT

## 2019-12-04 PROCEDURE — 59025 FETAL NON-STRESS TEST: CPT

## 2019-12-04 PROCEDURE — 86780 TREPONEMA PALLIDUM: CPT

## 2019-12-04 PROCEDURE — 85027 COMPLETE CBC AUTOMATED: CPT

## 2019-12-04 PROCEDURE — 59050 FETAL MONITOR W/REPORT: CPT

## 2019-12-04 PROCEDURE — 85014 HEMATOCRIT: CPT

## 2019-12-04 PROCEDURE — 86901 BLOOD TYPING SEROLOGIC RH(D): CPT

## 2019-12-04 RX ADMIN — Medication 975 MILLIGRAM(S): at 08:36

## 2019-12-04 RX ADMIN — Medication 600 MILLIGRAM(S): at 05:30

## 2019-12-04 RX ADMIN — Medication 975 MILLIGRAM(S): at 09:30

## 2019-12-04 RX ADMIN — Medication 600 MILLIGRAM(S): at 11:04

## 2019-12-04 RX ADMIN — Medication 1 TABLET(S): at 11:05

## 2019-12-04 RX ADMIN — Medication 600 MILLIGRAM(S): at 06:30

## 2019-12-04 RX ADMIN — Medication 600 MILLIGRAM(S): at 12:00

## 2019-12-04 NOTE — DISCHARGE NOTE OB - PATIENT PORTAL LINK FT
You can access the FollowMyHealth Patient Portal offered by [facility] by registering at the following website: [email or web address]. By joining [email or web address]s FollowMyHealth portal, you will also be able to view your health information using other applications (apps) compatible with our system.

## 2019-12-04 NOTE — DISCHARGE NOTE OB - MATERIALS PROVIDED
[facility] [place] Screening Program/Breastfeeding Mother’s Support Group Information/Guide to Postpartum Care/Vaccinations/Birth Certificate Instructions/[facility] Hearing Screen Program/Back To Sleep Handout/Shaken Baby Prevention Handout/Breastfeeding Guide and Packet/Breastfeeding Log

## 2019-12-04 NOTE — DISCHARGE NOTE OB - CARE PROVIDER_API CALL
Sanya Ching)  Obstetrics and Gynecology  [address] [place] [id number]  Phone: [phone]  Fax: [phone]  Follow Up Time:

## 2019-12-04 NOTE — PROGRESS NOTE ADULT - SUBJECTIVE AND OBJECTIVE BOX
S: Patient doing well. No complaints. Minimal lochia. Pain controlled.    O: Vital Signs Last 24 Hrs  T(C): 36.6 (03 Dec 2019 18:00), Max: 36.6 (03 Dec 2019 18:00)  T(F): 97.9 (03 Dec 2019 18:00), Max: 97.9 (03 Dec 2019 18:00)  HR: 84 (03 Dec 2019 18:00) (84 - 84)  BP: 121/77 (03 Dec 2019 18:00) (121/77 - 121/77)  BP(mean): --  RR: 18 (03 Dec 2019 18:00) (18 - 18)  SpO2: 98% (03 Dec 2019 18:00) (98% - 98%)    Gen: NAD  Abd: soft, Nontender, Nondistended, fundus firm  Ext: no tenderness, mild edema    Labs:                        10.0   x     )-----------( x        ( 03 Dec 2019 08:54 )             31.4       A: 29y PPD#2 s/p VAVD doing well.    Plan:  Routine postpartum care  Encouraged out of bed  Regular diet    Discharge  GRETTA Ching MD

## 2020-01-25 NOTE — ED PROVIDER NOTE - NS ED ATTENDING STATEMENT MOD
<<----- Click to add NO pertinent Family History I have personally seen and examined this patient.  I have fully participated in the care of this patient. I have reviewed all pertinent clinical information, including history, physical exam, plan and the Resident’s note and agree except as noted.

## 2020-06-02 ENCOUNTER — TRANSCRIPTION ENCOUNTER (OUTPATIENT)
Age: 30
End: 2020-06-02

## 2022-04-19 ENCOUNTER — TRANSCRIPTION ENCOUNTER (OUTPATIENT)
Age: 32
End: 2022-04-19

## 2022-09-07 ENCOUNTER — NON-APPOINTMENT (OUTPATIENT)
Age: 32
End: 2022-09-07

## 2023-02-01 ENCOUNTER — NON-APPOINTMENT (OUTPATIENT)
Age: 33
End: 2023-02-01

## 2023-05-03 NOTE — PRE-ANESTHESIA EVALUATION ADULT - NSPREOPDXFT_GEN_ALL_CORE
IUP Winlevi Counseling:  I discussed with the patient the risks of topical clascoterone including but not limited to erythema, scaling, itching, and stinging. Patient voiced their understanding.

## 2024-03-21 ENCOUNTER — APPOINTMENT (OUTPATIENT)
Dept: OBGYN | Facility: CLINIC | Age: 34
End: 2024-03-21
Payer: COMMERCIAL

## 2024-03-21 PROCEDURE — 99395 PREV VISIT EST AGE 18-39: CPT

## 2024-03-21 PROCEDURE — 96127 BRIEF EMOTIONAL/BEHAV ASSMT: CPT

## 2024-09-28 ENCOUNTER — NON-APPOINTMENT (OUTPATIENT)
Age: 34
End: 2024-09-28